# Patient Record
Sex: MALE | Race: WHITE | NOT HISPANIC OR LATINO | Employment: OTHER | URBAN - METROPOLITAN AREA
[De-identification: names, ages, dates, MRNs, and addresses within clinical notes are randomized per-mention and may not be internally consistent; named-entity substitution may affect disease eponyms.]

---

## 2020-10-27 ENCOUNTER — OFFICE VISIT (OUTPATIENT)
Dept: GASTROENTEROLOGY | Facility: CLINIC | Age: 75
End: 2020-10-27
Payer: MEDICARE

## 2020-10-27 VITALS
DIASTOLIC BLOOD PRESSURE: 67 MMHG | HEIGHT: 72 IN | TEMPERATURE: 97 F | SYSTOLIC BLOOD PRESSURE: 104 MMHG | HEART RATE: 107 BPM | BODY MASS INDEX: 26.14 KG/M2 | RESPIRATION RATE: 16 BRPM | WEIGHT: 193 LBS

## 2020-10-27 DIAGNOSIS — K64.9 HEMORRHOIDS, UNSPECIFIED HEMORRHOID TYPE: ICD-10-CM

## 2020-10-27 DIAGNOSIS — R79.89 ABNORMAL LFTS (LIVER FUNCTION TESTS): Primary | ICD-10-CM

## 2020-10-27 DIAGNOSIS — K57.90 DIVERTICULOSIS: ICD-10-CM

## 2020-10-27 DIAGNOSIS — K21.9 GASTROESOPHAGEAL REFLUX DISEASE, UNSPECIFIED WHETHER ESOPHAGITIS PRESENT: ICD-10-CM

## 2020-10-27 DIAGNOSIS — L30.9 PERIANAL DERMATITIS: ICD-10-CM

## 2020-10-27 PROCEDURE — 99999 PR PBB SHADOW E&M-NEW PATIENT-LVL IV: ICD-10-PCS | Mod: PBBFAC,,, | Performed by: INTERNAL MEDICINE

## 2020-10-27 PROCEDURE — 99204 OFFICE O/P NEW MOD 45 MIN: CPT | Mod: PBBFAC,PN | Performed by: INTERNAL MEDICINE

## 2020-10-27 PROCEDURE — 99999 PR PBB SHADOW E&M-NEW PATIENT-LVL IV: CPT | Mod: PBBFAC,,, | Performed by: INTERNAL MEDICINE

## 2020-10-27 PROCEDURE — 99204 OFFICE O/P NEW MOD 45 MIN: CPT | Mod: S$PBB,,, | Performed by: INTERNAL MEDICINE

## 2020-10-27 PROCEDURE — 99204 PR OFFICE/OUTPT VISIT, NEW, LEVL IV, 45-59 MIN: ICD-10-PCS | Mod: S$PBB,,, | Performed by: INTERNAL MEDICINE

## 2020-10-27 RX ORDER — LOSARTAN POTASSIUM AND HYDROCHLOROTHIAZIDE 25; 100 MG/1; MG/1
TABLET ORAL
COMMUNITY
Start: 2020-09-09

## 2020-10-27 RX ORDER — EMPAGLIFLOZIN 25 MG/1
TABLET, FILM COATED ORAL
COMMUNITY
Start: 2020-09-09

## 2020-10-27 RX ORDER — ESOMEPRAZOLE MAGNESIUM 40 MG/1
CAPSULE, DELAYED RELEASE ORAL
COMMUNITY
Start: 2020-09-09

## 2020-10-27 RX ORDER — KETOCONAZOLE 20 MG/G
CREAM TOPICAL
COMMUNITY
Start: 2020-10-14

## 2020-10-27 RX ORDER — METFORMIN HYDROCHLORIDE 1000 MG/1
TABLET ORAL
COMMUNITY
Start: 2020-10-26

## 2020-10-27 RX ORDER — TRAZODONE HYDROCHLORIDE 100 MG/1
TABLET ORAL
COMMUNITY
Start: 2020-09-09

## 2020-10-27 RX ORDER — SITAGLIPTIN 100 MG/1
TABLET, FILM COATED ORAL
COMMUNITY
Start: 2020-09-09

## 2020-10-27 RX ORDER — ROSUVASTATIN CALCIUM 20 MG/1
TABLET, COATED ORAL
COMMUNITY
Start: 2020-09-09

## 2020-10-27 NOTE — PATIENT INSTRUCTIONS
The perianal dermatitis has resolved.  The complete [facility] emergency room records been requested.  The bilirubin is elevated.  He will need hepatitis profile and also an ultrasound.  He follows up with his urologist and PCP.  He continues his diabetic diet current medications vitamins and minerals.  He will add fiber to the diet.  He continues his reflux regimen and the Nexium.

## 2020-10-27 NOTE — PROGRESS NOTES
"Subjective:       Patient ID: Michael Cano is a 75 y.o. male.    Chief Complaint: Hemorrhoids, Gastroesophageal Reflux, and Elevated Hepatic Enzymes    He was referred from primary care plus in [place].  CC.  Patient request referral to Dr. Fuentes at doctors in [place].  Previously saw him for GI issues.  Patient has complains of hemorrhoids and is requesting medication.   patient states he has not had lab work done would like to wait until he comes back at the end of October.  Patient is a 75-year-old male that presents to the clinic requesting referral with a history of G GERD and hemorrhoids.  Patient has rectal pain and bright red bleeding.  No melena.  No recent weight loss.  Patient has history of GERD, diabetes hypertension hyperlipidemia.  No weight gain weight loss 50 he denies chest pain shortness or breath her headaches.  He sees   for testicular hypo function.  He recently had labs.  He has an elevated hemoglobin, hematocrit and bilirubin levels.  Patient recently followed up with urologist  who was was taken off his testosterone injection until his blood was "thin .  Hemoglobin A1c and cholesterol levels are well controlled.  Medication include jardiance trazodone Nexium,januvia, losartan hydrochlorothiazide Crestor metformin Flonase Lyrica and history of hypertension diabetes low cholesterol high cholesterol granulomatous lung disease.  Surgical history reveals a skin cancer removal left ankle repair.  He has never been a smoker.  Review systems is negative.  Assessment other abnormality of red blood cells unspecified hemorrhoids gastroesophageal reflux with out esophagitis disorder bilirubin tablets some hyperlipidemia type 2 diabetes labs revealed.  The bilirubin is 1.5.  Triglycerides are elevated 192 in the HDL is low.  He went to the emergency room for anal discomfort and perianal dermatitis.  He states that his symptoms have resolved.  He denies bleeding or anal discomfort.  He " has dermatitis has resolved with the current therapy.  He is having daily bowel movements.  He denies significant pyrosis or dysphagia.  He denies hematemesis hematochezia jaundice or bleeding.      Allergies:  Review of patient's allergies indicates:   Allergen Reactions    Sulfa (sulfonamide antibiotics)        Medications:    Current Outpatient Medications:     esomeprazole (NEXIUM) 40 MG capsule, , Disp: , Rfl:     JANUVIA 100 mg Tab, , Disp: , Rfl:     JARDIANCE 25 mg tablet, , Disp: , Rfl:     ketoconazole (NIZORAL) 2 % cream, , Disp: , Rfl:     losartan-hydrochlorothiazide 100-25 mg (HYZAAR) 100-25 mg per tablet, , Disp: , Rfl:     metFORMIN (GLUCOPHAGE) 1000 MG tablet, , Disp: , Rfl:     rosuvastatin (CRESTOR) 20 MG tablet, , Disp: , Rfl:     traZODone (DESYREL) 100 MG tablet, , Disp: , Rfl:     Past Medical History:   Diagnosis Date    Abnormal liver function test     Diabetes     GERD (gastroesophageal reflux disease)     HTN (hypertension)     Hyperlipidemia     Perianal rash     Rectal pain     Sleep disorder, unspecified        Past Surgical History:   Procedure Laterality Date    ANKLE SURGERY      COLONOSCOPY      UPPER GASTROINTESTINAL ENDOSCOPY           Review of Systems   Constitutional: Negative for appetite change, fever and unexpected weight change.   HENT: Negative for trouble swallowing.         No jaundice.   Respiratory: Negative for cough, shortness of breath and wheezing.         He has never used tobacco alcohol.  He denies dysphagia aspiration hemoptysis chronic cough chronic sputum production or dyspnea on exertion.   Cardiovascular: Negative for chest pain.        He denies exertional symptoms.  He denies rhythm disturbance.  His hyperlipidemia he states and hypertension have been   Gastrointestinal: Negative for abdominal distention, abdominal pain, anal bleeding, blood in stool, constipation, diarrhea, nausea and rectal pain.        He has a history of  diverticulosis and gastroesophageal reflux.  He denies GI symptoms and does not want further GI evaluation such as upper endoscopy or colonoscopy.   Endocrine:        He states his diabetes is well controlled.  He is on the ADA diet and takes his medications as prescribed.   Musculoskeletal: Positive for arthralgias. Negative for back pain and neck pain.   Skin: Negative for pallor and rash.   Neurological: Negative for dizziness, seizures, syncope, speech difficulty, weakness and numbness.   Hematological: Negative for adenopathy.   Psychiatric/Behavioral: Negative for confusion.       Objective:      Physical Exam  Vitals signs reviewed.   Constitutional:       Appearance: He is well-developed.      Comments: Well-nourished well-hydrated afebrile nonicteric white male.  He is sitting comfortably in the chair.  He is normocephalic.  Pupils are normal.  He appears to be oriented x3.  He can relate his history and answer questions appropriately.   HENT:      Head: Normocephalic.   Eyes:      Pupils: Pupils are equal, round, and reactive to light.   Neck:      Musculoskeletal: Normal range of motion and neck supple.      Thyroid: No thyromegaly.      Trachea: No tracheal deviation.   Cardiovascular:      Rate and Rhythm: Normal rate and regular rhythm.      Heart sounds: Normal heart sounds.   Pulmonary:      Effort: Pulmonary effort is normal.      Breath sounds: Normal breath sounds.   Abdominal:      General: There is no distension.      Palpations: Abdomen is soft. There is no mass.      Tenderness: There is no abdominal tenderness. There is no guarding or rebound.      Hernia: No hernia is present.      Comments: Perianal area reveals mild hyperemia.  Anal sphincter is normal.   Musculoskeletal: Normal range of motion.      Comments: He can ambulate normally.  He he can go from the sitting the standing position without difficulty.   Lymphadenopathy:      Cervical: No cervical adenopathy.   Skin:     General: Skin  is warm and dry.   Neurological:      Mental Status: He is alert and oriented to person, place, and time.      Cranial Nerves: No cranial nerve deficit.   Psychiatric:         Behavior: Behavior normal.           Plan:       Abnormal LFTs (liver function tests)  -     US Abdomen Complete; Future; Expected date: 10/27/2020  -     Hepatitis B Surface Antigen; Future; Expected date: 10/27/2020  -     Hepatitis B Core Antibody, Total; Future; Expected date: 10/27/2020    Perianal dermatitis    Hemorrhoids, unspecified hemorrhoid type    Diverticulosis    Gastroesophageal reflux disease, unspecified whether esophagitis present     He will continue his diabetic diet reflux regimen vitamins and minerals.  He continues his current cream and therapy for the perianal dermatitis.  He will add more fiber to the diet.  He follows up with his urologist and PCP.  At this point he does not want further GI evaluation such as upper endoscopy and colonoscopy.  He has an abnormal liver test and further labs and ultrasound is scheduled.

## 2020-10-27 NOTE — LETTER
October 30, 2020      Rita Dumont, FN  [address]  [place] [address]           [facility] [address]  [place] [address]  Phone: [phone]  Fax: [phone]          Patient: Michael Cano   MR Number: [id number]   YOB: 1945   Date of Visit: 10/27/2020       Dear Rita Dumont:    Thank you for referring Michael Cano to me for evaluation. Attached you will find relevant portions of my assessment and plan of care.    If you have questions, please do not hesitate to call me. I look forward to following Michael Cano along with you.    Sincerely,    Davian Fuentes MD    Enclosure  CC:  No Recipients    If you would like to receive this communication electronically, please contact [email or web address] or [phone] to request more information on [employer] access.    For providers and/or their staff who would like to refer a patient to [facility], please contact us through our one-stop-shop provider referral line, [facility], at [phone].    If you feel you have received this communication in error or would no longer like to receive these types of communications, please e-mail [email or web address]

## 2020-10-30 PROBLEM — K21.9 GASTROESOPHAGEAL REFLUX DISEASE: Status: ACTIVE | Noted: 2020-10-30

## 2020-10-30 PROBLEM — K64.9 HEMORRHOIDS: Status: ACTIVE | Noted: 2020-10-30

## 2020-10-30 PROBLEM — L30.9 PERIANAL DERMATITIS: Status: ACTIVE | Noted: 2020-10-30

## 2020-10-30 PROBLEM — K57.90 DIVERTICULOSIS: Status: ACTIVE | Noted: 2020-10-30

## 2020-10-30 PROBLEM — R79.89 ABNORMAL LFTS (LIVER FUNCTION TESTS): Status: ACTIVE | Noted: 2020-10-30

## 2020-11-20 ENCOUNTER — TELEPHONE (OUTPATIENT)
Dept: GASTROENTEROLOGY | Facility: CLINIC | Age: 75
End: 2020-11-20

## 2020-11-20 ENCOUNTER — HOSPITAL ENCOUNTER (OUTPATIENT)
Dept: RADIOLOGY | Facility: HOSPITAL | Age: 75
Discharge: HOME | End: 2020-11-20
Attending: INTERNAL MEDICINE
Payer: MEDICARE

## 2020-11-20 DIAGNOSIS — R79.89 ABNORMAL LFTS (LIVER FUNCTION TESTS): ICD-10-CM

## 2020-11-20 PROCEDURE — 76700 US ABDOMEN COMPLETE: ICD-10-PCS | Mod: 26,,, | Performed by: RADIOLOGY

## 2020-11-20 PROCEDURE — 76700 US EXAM ABDOM COMPLETE: CPT | Mod: 26,,, | Performed by: RADIOLOGY

## 2020-11-20 PROCEDURE — 76700 US EXAM ABDOM COMPLETE: CPT | Mod: TC

## 2020-11-20 RX ORDER — VITAMIN E 268 MG
400 CAPSULE ORAL DAILY
COMMUNITY

## 2020-11-23 ENCOUNTER — TELEPHONE (OUTPATIENT)
Dept: GASTROENTEROLOGY | Facility: CLINIC | Age: 75
End: 2020-11-23

## 2020-11-23 DIAGNOSIS — R79.89 ABNORMAL LFTS (LIVER FUNCTION TESTS): Primary | ICD-10-CM

## 2020-11-23 NOTE — TELEPHONE ENCOUNTER
Returned call. Instructions given. Stated understanding.     ----- Message from Davian Fuentes MD sent at 11/20/2020 11:25 AM CST -----  He has a fatty liver start vitamin E 400 units per day.  Weight loss would be ideal.  He has a contracted gallbladder.  Schedule and biliary scan

## 2020-11-23 NOTE — TELEPHONE ENCOUNTER
----- Message from Davian Fuentes MD sent at 11/20/2020 11:25 AM CST -----  He has a fatty liver start vitamin E 400 units per day.  Weight loss would be ideal.  He has a contracted gallbladder.  Schedule and biliary scan

## 2020-11-27 ENCOUNTER — TELEPHONE (OUTPATIENT)
Dept: HEMATOLOGY/ONCOLOGY | Facility: CLINIC | Age: 75
End: 2020-11-27

## 2020-11-27 NOTE — TELEPHONE ENCOUNTER
Pt wanted to know what would be done during his HIDA scan. Explained it to him and her verbalized understanding. Also would like for me to send a message to Dr. Marcelo to ask him how long has he been treating his GERD. Explained to him we do not have his prior records so I will send him a message.

## 2020-11-27 NOTE — TELEPHONE ENCOUNTER
----- Message from Jane Jerome sent at 11/27/2020  4:00 PM CST -----  Contact: patient  Type: Needs Medical Advice  Who Called:  patient  Symptoms (please be specific):    How long has patient had these symptoms:    Pharmacy name and phone #:    Best Call Back Number:   Additional Information: requesting a call back regarding test have question that  order

## 2020-12-01 ENCOUNTER — TELEPHONE (OUTPATIENT)
Dept: GASTROENTEROLOGY | Facility: CLINIC | Age: 75
End: 2020-12-01

## 2020-12-01 ENCOUNTER — HOSPITAL ENCOUNTER (OUTPATIENT)
Dept: RADIOLOGY | Facility: HOSPITAL | Age: 75
Discharge: HOME | End: 2020-12-01
Attending: INTERNAL MEDICINE
Payer: MEDICARE

## 2020-12-01 DIAGNOSIS — R79.89 ABNORMAL LFTS (LIVER FUNCTION TESTS): ICD-10-CM

## 2020-12-01 PROCEDURE — A9537 TC99M MEBROFENIN: HCPCS

## 2020-12-01 PROCEDURE — 78227 NM HEPATOBILIARY(HIDA) WITH PHARM AND EF: ICD-10-PCS | Mod: 26,,, | Performed by: RADIOLOGY

## 2020-12-01 PROCEDURE — 78227 HEPATOBIL SYST IMAGE W/DRUG: CPT | Mod: 26,,, | Performed by: RADIOLOGY

## 2020-12-01 NOTE — TELEPHONE ENCOUNTER
Patient called twice with no answer. Left voice mail both times.     ----- Message from Davian Fuentes MD sent at 12/1/2020  1:11 PM CST -----  Tell him scan shows a normally functioning gallbladder and bile system.

## 2020-12-02 ENCOUNTER — TELEPHONE (OUTPATIENT)
Dept: GASTROENTEROLOGY | Facility: CLINIC | Age: 75
End: 2020-12-02

## 2020-12-02 NOTE — TELEPHONE ENCOUNTER
Informed pt of recent scan and Hep B results, pt verbalized an understanding.     ----- Message from Bella José sent at 12/2/2020  1:28 PM CST -----  Contact: pt  Results   Patient called back he missed a call from the nurse to get his labs results yesterday.  Patient is asking if you will call him back  [phone]       No

## 2020-12-14 ENCOUNTER — TELEPHONE (OUTPATIENT)
Dept: FAMILY MEDICINE | Facility: CLINIC | Age: 75
End: 2020-12-14

## 2020-12-14 NOTE — TELEPHONE ENCOUNTER
----- Message from Priyanka Kidd sent at 12/14/2020  8:55 AM CST -----  Contact: Pt  Needs Medical Advice  Who Called: Pt  Symptoms: Peranal Dermitis  How Long Has the Patient had these Symptoms:   Pharmacy Name and Phone #:   [facility] - [place], [place] - [address]  [address] [facility] [place] [address]  Phone: [phone] Fax: [phone]  Best Contact #: [phone]  Additional Information:

## 2020-12-17 ENCOUNTER — OFFICE VISIT (OUTPATIENT)
Dept: FAMILY MEDICINE | Facility: CLINIC | Age: 75
End: 2020-12-17
Payer: MEDICARE

## 2020-12-17 VITALS
WEIGHT: 191 LBS | TEMPERATURE: 98 F | DIASTOLIC BLOOD PRESSURE: 73 MMHG | SYSTOLIC BLOOD PRESSURE: 110 MMHG | RESPIRATION RATE: 18 BRPM | BODY MASS INDEX: 25.87 KG/M2 | HEIGHT: 72 IN | HEART RATE: 108 BPM

## 2020-12-17 DIAGNOSIS — L30.9 PERIANAL DERMATITIS: Primary | ICD-10-CM

## 2020-12-17 PROCEDURE — 99999 PR PBB SHADOW E&M-EST. PATIENT-LVL III: ICD-10-PCS | Mod: PBBFAC,,, | Performed by: NURSE PRACTITIONER

## 2020-12-17 PROCEDURE — 99999 PR PBB SHADOW E&M-EST. PATIENT-LVL III: CPT | Mod: PBBFAC,,, | Performed by: NURSE PRACTITIONER

## 2020-12-17 PROCEDURE — 99213 OFFICE O/P EST LOW 20 MIN: CPT | Mod: PBBFAC,PN | Performed by: NURSE PRACTITIONER

## 2020-12-17 PROCEDURE — 99213 PR OFFICE/OUTPT VISIT, EST, LEVL III, 20-29 MIN: ICD-10-PCS | Mod: S$PBB,,, | Performed by: NURSE PRACTITIONER

## 2020-12-17 PROCEDURE — 99213 OFFICE O/P EST LOW 20 MIN: CPT | Mod: S$PBB,,, | Performed by: NURSE PRACTITIONER

## 2020-12-17 RX ORDER — ESZOPICLONE 3 MG/1
TABLET, FILM COATED ORAL
COMMUNITY
Start: 2020-12-10

## 2020-12-17 RX ORDER — HYDROCORTISONE 25 MG/G
CREAM TOPICAL 2 TIMES DAILY
Qty: 453.6 G | Refills: 0 | Status: SHIPPED | OUTPATIENT
Start: 2020-12-17 | End: 2020-12-23 | Stop reason: SDUPTHER

## 2020-12-17 NOTE — PROGRESS NOTES
Subjective:       Patient ID: Michael Cano is a 75 y.o. male.    Chief Complaint: Diaper Rash    Mr. Michael Cano is a 75 year old male who presents to the clinic today for perianal dermatitis follow up. He reports he had improvement in his symptoms, but they have returned. Reports he is putting his cream on as requested. Reports that he is washing after BM's and blow drying. Reports itching has returned. Denies pain with BM's. Denies bleeding.     Review of Systems   Constitutional: Negative for activity change, diaphoresis and fatigue.   Respiratory: Negative for cough, chest tightness, shortness of breath and wheezing.    Cardiovascular: Negative for chest pain and palpitations.   Gastrointestinal: Positive for rectal pain. Negative for abdominal pain, diarrhea, nausea and vomiting.   Musculoskeletal: Negative for arthralgias and myalgias.   Skin: Negative for color change.   Neurological: Negative for dizziness, tremors, syncope, weakness and headaches.   Psychiatric/Behavioral: Negative for sleep disturbance. The patient is not nervous/anxious.          Reviewed family, medical, surgical, and social history.    Objective:      /73 (BP Location: Left arm, Patient Position: Sitting, BP Method: Medium (Automatic))   Pulse 108   Temp 98 °F (36.7 °C)   Resp 18   Ht 6' (1.829 m)   Wt 86.6 kg (191 lb)   BMI 25.90 kg/m²   Physical Exam  Vitals signs reviewed.   Constitutional:       Appearance: Normal appearance.   HENT:      Head: Normocephalic.      Nose: Nose normal.      Mouth/Throat:      Mouth: Mucous membranes are moist.      Pharynx: Oropharynx is clear.   Eyes:      Extraocular Movements: Extraocular movements intact.      Conjunctiva/sclera: Conjunctivae normal.      Pupils: Pupils are equal, round, and reactive to light.   Neck:      Musculoskeletal: Normal range of motion.   Cardiovascular:      Rate and Rhythm: Normal rate and regular rhythm.   Pulmonary:      Effort: Pulmonary effort is  normal.      Breath sounds: Normal breath sounds. No wheezing.   Abdominal:      General: Bowel sounds are normal. There is no distension.      Palpations: Abdomen is soft.   Musculoskeletal: Normal range of motion.   Skin:     General: Skin is warm.      Capillary Refill: Capillary refill takes less than 2 seconds.   Neurological:      General: No focal deficit present.      Mental Status: He is alert and oriented to person, place, and time.   Psychiatric:         Mood and Affect: Mood normal.         Behavior: Behavior normal.         Thought Content: Thought content normal.         Judgment: Judgment normal.         Assessment:       1. Perianal dermatitis        Plan:       Perianal dermatitis  -     zinc oxide 10 % Oint; Apply 1 application topically 2 (two) times daily as needed.  Dispense: 56.7 g; Refill: 0  -     hydrocortisone 2.5 % cream; Apply topically 2 (two) times daily.  Dispense: 453.6 g; Refill: 0  -     Stool Exam-Ova,Cysts,Parasites; Future; Expected date: 12/17/2020  -     WBC, Stool; Future; Expected date: 12/17/2020  -     Stool culture; Future; Expected date: 12/17/2020  -     H. pylori antigen, stool; Future; Expected date: 12/17/2020  -     Giardia / Cryptosporidum, EIA; Future; Expected date: 12/17/2020          PLAN:  - Discussed with patient the plan of care  - Obtain stool specimen  - Continue hygiene precautions  - Start zinc as barrier cream  - Medications reviewed. Medication side effects discussed. Patient has no questions or concerns at this time. Informed patient to notify me regarding any concerns.   - Continue monitoring  - Informed patient to please notify me with any questions or concerns at anytime  - Follow up ordered for 2 weeks      Risks, benefits, and side effects were discussed with the patient. All questions were answered to the fullest satisfaction of the patient, and pt verbalized understanding and agreement to treatment plan. Pt was to call with any new or worsening  symptoms, or present to the ER.

## 2020-12-23 DIAGNOSIS — L30.9 PERIANAL DERMATITIS: ICD-10-CM

## 2020-12-23 NOTE — TELEPHONE ENCOUNTER
----- Message from Tee Chandler sent at 12/23/2020 10:54 AM CST -----  Type: Needs Medical Advice    Who Called:  Ptnt  [phone]    Symptoms (please be specific):  Skin rash    How long has patient had these symptoms:  For a while now.    Medication:     hydrocortisone 2.5 % cream 453.6 g 0 12/17/2020  --  Sig - Route: Apply topically 2 (two) times daily. - Topical (Top)  Sent to pharmacy as: hydrocortisone 2.5 % cream    And    zinc oxide 10 % Oint 56.7 g 0 12/17/2020  --  Sig - Route: Apply 1 application topically 2 (two) times daily as needed. - Topical (Top)  Sent to pharmacy as: zinc oxide 10 % Oint      Pharmacy name and phone #:      [facility] - [place], [place] - [address]  [address] [facility] [place] [address]  Phone: [phone] Fax: [phone]        Best Call Back Number: Ptnt  [phone]      Additional Information:     Advised returning a call to the office about how to use these medications, results with medication.  Also has question on other stool sample results.    Please call.

## 2020-12-23 NOTE — TELEPHONE ENCOUNTER
Encounter should have been placed under Maliha Hess NP.    Patient requesting refills of itch/rash medication.  Sent to provider for approval.

## 2020-12-24 RX ORDER — HYDROCORTISONE 25 MG/G
CREAM TOPICAL 2 TIMES DAILY
Qty: 453.6 G | Refills: 0 | Status: SHIPPED | OUTPATIENT
Start: 2020-12-24 | End: 2022-07-26 | Stop reason: SDUPTHER

## 2022-07-26 DIAGNOSIS — L30.9 PERIANAL DERMATITIS: ICD-10-CM

## 2022-07-26 RX ORDER — HYDROCORTISONE 25 MG/G
CREAM TOPICAL 2 TIMES DAILY
Qty: 453.6 G | Refills: 0 | Status: SHIPPED | OUTPATIENT
Start: 2022-07-26

## 2022-07-26 NOTE — TELEPHONE ENCOUNTER
----- Message from Rula Stanley sent at 7/26/2022 12:56 PM CDT -----  Contact: pt [phone]  Type:  RX Refill Request    Who Called: Pt  Refill or New Rx:  refill  RX Name and Strength:  hydrocortisone 2.5 % cream  How is the patient currently taking it? (ex. 1XDay):    Is this a 30 day or 90 day RX:    Preferred Pharmacy with phone number:    [facility], [place] - [address] [place] [address]  Phone: [phone] Fax: [phone]      Local or Mail Order:  Local  Ordering Provider:  EDUARDO Naik      Type:  RX Refill Request    Who Called:   Refill or New Rx:  refill  RX Name and Strength:  zinc oxide 10 % Oint  How is the patient currently taking it? (ex. 1XDay):    Is this a 30 day or 90 day RX:    Preferred Pharmacy with phone number:    [facility], [address] [address]  Phone: [phone] Fax: [phone]      Local or Mail Order:  local   Ordering Provider:  EDUARDO Naik  Best Call Back Number:  [phone]